# Patient Record
Sex: FEMALE | Race: WHITE
[De-identification: names, ages, dates, MRNs, and addresses within clinical notes are randomized per-mention and may not be internally consistent; named-entity substitution may affect disease eponyms.]

---

## 2018-02-21 ENCOUNTER — HOSPITAL ENCOUNTER (EMERGENCY)
Dept: HOSPITAL 62 - ER | Age: 53
Discharge: HOME | End: 2018-02-21
Payer: SELF-PAY

## 2018-02-21 VITALS — DIASTOLIC BLOOD PRESSURE: 69 MMHG | SYSTOLIC BLOOD PRESSURE: 129 MMHG

## 2018-02-21 DIAGNOSIS — R05: ICD-10-CM

## 2018-02-21 DIAGNOSIS — R09.81: ICD-10-CM

## 2018-02-21 DIAGNOSIS — R50.9: ICD-10-CM

## 2018-02-21 DIAGNOSIS — B34.9: Primary | ICD-10-CM

## 2018-02-21 LAB
ADD MANUAL DIFF: NO
ALBUMIN SERPL-MCNC: 5.4 G/DL (ref 3.5–5)
ALP SERPL-CCNC: 72 U/L (ref 38–126)
ALT SERPL-CCNC: 52 U/L (ref 9–52)
ANION GAP SERPL CALC-SCNC: 19 MMOL/L (ref 5–19)
APPEARANCE UR: (no result)
APTT PPP: (no result) S
AST SERPL-CCNC: 64 U/L (ref 14–36)
BASOPHILS # BLD AUTO: 0 10^3/UL (ref 0–0.2)
BASOPHILS NFR BLD AUTO: 0.2 % (ref 0–2)
BILIRUB DIRECT SERPL-MCNC: 0.2 MG/DL (ref 0–0.4)
BILIRUB SERPL-MCNC: 0.4 MG/DL (ref 0.2–1.3)
BILIRUB UR QL STRIP: NEGATIVE
BUN SERPL-MCNC: 20 MG/DL (ref 7–20)
CALCIUM: 10 MG/DL (ref 8.4–10.2)
CHLORIDE SERPL-SCNC: 97 MMOL/L (ref 98–107)
CO2 SERPL-SCNC: 24 MMOL/L (ref 22–30)
EOSINOPHIL # BLD AUTO: 0 10^3/UL (ref 0–0.6)
EOSINOPHIL NFR BLD AUTO: 0 % (ref 0–6)
ERYTHROCYTE [DISTWIDTH] IN BLOOD BY AUTOMATED COUNT: 13.4 % (ref 11.5–14)
GLUCOSE SERPL-MCNC: 114 MG/DL (ref 75–110)
GLUCOSE UR STRIP-MCNC: NEGATIVE MG/DL
HCT VFR BLD CALC: 43.7 % (ref 36–47)
HGB BLD-MCNC: 15.1 G/DL (ref 12–15.5)
KETONES UR STRIP-MCNC: 20 MG/DL
LYMPHOCYTES # BLD AUTO: 1.2 10^3/UL (ref 0.5–4.7)
LYMPHOCYTES NFR BLD AUTO: 22.3 % (ref 13–45)
MCH RBC QN AUTO: 30.3 PG (ref 27–33.4)
MCHC RBC AUTO-ENTMCNC: 34.5 G/DL (ref 32–36)
MCV RBC AUTO: 88 FL (ref 80–97)
MONOCYTES # BLD AUTO: 0.6 10^3/UL (ref 0.1–1.4)
MONOCYTES NFR BLD AUTO: 10.4 % (ref 3–13)
NEUTROPHILS # BLD AUTO: 3.7 10^3/UL (ref 1.7–8.2)
NEUTS SEG NFR BLD AUTO: 67.1 % (ref 42–78)
NITRITE UR QL STRIP: NEGATIVE
PH UR STRIP: 5 [PH] (ref 5–9)
PLATELET # BLD: 190 10^3/UL (ref 150–450)
POTASSIUM SERPL-SCNC: 4.2 MMOL/L (ref 3.6–5)
PROT SERPL-MCNC: 9.1 G/DL (ref 6.3–8.2)
PROT UR STRIP-MCNC: 100 MG/DL
RBC # BLD AUTO: 4.98 10^6/UL (ref 3.72–5.28)
SODIUM SERPL-SCNC: 139.7 MMOL/L (ref 137–145)
SP GR UR STRIP: 1.03
TOTAL CELLS COUNTED % (AUTO): 100 %
UROBILINOGEN UR-MCNC: NEGATIVE MG/DL (ref ?–2)
WBC # BLD AUTO: 5.5 10^3/UL (ref 4–10.5)

## 2018-02-21 PROCEDURE — 36415 COLL VENOUS BLD VENIPUNCTURE: CPT

## 2018-02-21 PROCEDURE — 71046 X-RAY EXAM CHEST 2 VIEWS: CPT

## 2018-02-21 PROCEDURE — 81001 URINALYSIS AUTO W/SCOPE: CPT

## 2018-02-21 PROCEDURE — 99285 EMERGENCY DEPT VISIT HI MDM: CPT

## 2018-02-21 PROCEDURE — 85025 COMPLETE CBC W/AUTO DIFF WBC: CPT

## 2018-02-21 PROCEDURE — 96374 THER/PROPH/DIAG INJ IV PUSH: CPT

## 2018-02-21 PROCEDURE — 96361 HYDRATE IV INFUSION ADD-ON: CPT

## 2018-02-21 PROCEDURE — 80053 COMPREHEN METABOLIC PANEL: CPT

## 2018-02-21 NOTE — ER DOCUMENT REPORT
ED Medical Screen (RME)





- General


Chief Complaint: Weakness


Stated Complaint: WEAKNESS


Time Seen by Provider: 02/21/18 14:07


Notes: 





pt only states "water" and "sick"


TRAVEL OUTSIDE OF THE [place] IN LAST 30 DAYS: No





- Related Data


Allergies/Adverse Reactions: 


 





No Known Allergies Allergy (Unverified 02/21/18 12:37)


 











Past Medical History


Renal/ Medical History: Denies: Hx Peritoneal Dialysis


Past Surgical History: Reports: Hx Hysterectomy





- Immunizations


Hx Diphtheria, Pertussis, Tetanus Vaccination: Yes





Physical Exam





- Vital signs


Vitals: 





 











Temp Pulse Resp BP Pulse Ox


 


 98.8 F   91   20   127/84 H  99 


 


 02/21/18 12:40  02/21/18 12:40  02/21/18 12:40  02/21/18 12:40  02/21/18 12:40














Course





- Vital Signs


Vital signs: 





 











Temp Pulse Resp BP Pulse Ox


 


 98.8 F   91   20   127/84 H  99 


 


 02/21/18 12:40  02/21/18 12:40  02/21/18 12:40  02/21/18 12:40  02/21/18 12:40

## 2018-02-21 NOTE — RADIOLOGY REPORT (SQ)
EXAM DESCRIPTION:  CHEST PA/LAT



COMPLETED DATE/TIME:  2/21/2018 4:01 pm



REASON FOR STUDY:  cough, fevers



COMPARISON:  None.



EXAM PARAMETERS:  NUMBER OF VIEWS: two views

TECHNIQUE: Digital Frontal and Lateral radiographic views of the chest acquired.

RADIATION DOSE: NA

LIMITATIONS: none



FINDINGS:  LUNGS AND PLEURA: No opacities, masses or pneumothorax. No pleural effusion.

MEDIASTINUM AND HILAR STRUCTURES: No masses or contour abnormalities.

HEART AND VASCULAR STRUCTURES: Heart size upper limits of normal.

BONES: No acute findings.

HARDWARE: None in the chest.

OTHER: No other significant finding.



IMPRESSION:  NO ACUTE RADIOGRAPHIC FINDING IN THE CHEST.



TECHNICAL DOCUMENTATION:  JOB ID:  [id number]

 2011 [id number]- All Rights Reserved

## 2018-02-21 NOTE — ER DOCUMENT REPORT
ED General





- General


Chief Complaint: Weakness


Stated Complaint: WEAKNESS


Time Seen by Provider: 02/21/18 14:07


Notes: 


The patient is a 52-year-old female, no past medical history, presents with 4 

days of generalized body aches, nasal congestion, subjective fevers, dry cough 

and chills.  She did not get her flu shot this year.  She last took Tylenol 

about 8 hours ago.  Patient denies nausea, vomiting, rash, headache, neck 

stiffness, recent travel, shortness of breath or chest pain.


TRAVEL OUTSIDE OF THE [place] IN LAST 30 DAYS: No





- Related Data


Allergies/Adverse Reactions: 


 





No Known Allergies Allergy (Unverified 02/21/18 12:37)


 











Past Medical History





- General


Information source: Patient





- Social History


Smoking Status: Unknown if Ever Smoked


Family History: Reviewed & Not Pertinent


Patient has suicidal ideation: No


Patient has homicidal ideation: No


Renal/ Medical History: Denies: Hx Peritoneal Dialysis


Past Surgical History: Reports: Hx Hysterectomy





- Immunizations


Hx Diphtheria, Pertussis, Tetanus Vaccination: Yes





Review of Systems





- Review of Systems


Notes: 


REVIEW OF SYSTEMS:


CONSTITUTIONAL: +fevers, +chills


EENT: -eye pain, -difficulty swallowing, +nasal congestion


CARDIOVASCULAR: -chest pain, -syncope.


RESPIRATORY: +cough, -SOB


GASTROINTESTINAL: -abdominal pain, -nausea, -vomiting, -diarrhea


GENITOURINARY: -dysuria, -hematuria


MUSCULOSKELETAL: -back pain, -neck pain


SKIN: -rash or skin lesions.


HEMATOLOGIC: -easy bruising or bleeding.


LYMPHATIC: -swollen, enlarged glands.


NEUROLOGICAL: -altered mental status or loss of consciousness, -headache, -

neurologic symptoms


PSYCHIATRIC: -anxiety, -depression.


ALL OTHER SYSTEMS REVIEWED AND NEGATIVE.





Physical Exam





- Vital signs


Vitals: 


 











Temp Pulse Resp BP Pulse Ox


 


 98.8 F   91   20   127/84 H  99 


 


 02/21/18 12:40  02/21/18 12:40  02/21/18 12:40  02/21/18 12:40  02/21/18 12:40














- Notes


Notes: 


PHYSICAL EXAMINATION:


GENERAL: Well-appearing, well-nourished and in no acute distress.


HEAD: Atraumatic, normocephalic.


EYES: Pupils equal round and reactive to light, extraocular movements intact, 

sclera anicteric, conjunctiva are normal.


ENT: nares patent, oropharynx clear without exudates.  Moist mucous membranes.


NECK: Normal range of motion, supple without lymphadenopathy


LUNGS: Breath sounds clear to auscultation bilaterally and equal.  No wheezes 

rales or rhonchi.


HEART: Regular rate and rhythm without murmurs


ABDOMEN: Soft, nontender, normoactive bowel sounds.  No guarding, no rebound.  

No masses appreciated.


EXTREMITIES: Normal range of motion, no pitting or edema.  No cyanosis.


NEUROLOGICAL: Cranial nerves grossly intact.  Normal speech, normal gait.  

Normal sensory and motor exams.


PSYCH: Normal mood, normal affect.


SKIN: Warm, Dry, normal turgor, no rashes or lesions noted.





Course





- Re-evaluation


Re-evalutation: 


Patient has symptoms of a viral illness, but her vital signs are normal.  Chest 

x-ray does not show any signs of pneumonia and the rest of blood work is 

unremarkable.  Instructed her about symptomatic treatment.  Since this is 5 

days of symptoms, Tamiflu is not indicated.  Given strict return precautions 

and she understands.





- Vital Signs


Vital signs: 


 











Temp Pulse Resp BP Pulse Ox


 


 98.8 F   91   20   127/84 H  99 


 


 02/21/18 12:40  02/21/18 12:40  02/21/18 12:40  02/21/18 12:40  02/21/18 12:40














- Laboratory


Result Diagrams: 


 02/21/18 14:35





 02/21/18 14:35


Laboratory results interpreted by me: 


 











  02/21/18 02/21/18





  14:35 15:16


 


Chloride  97 L 


 


Glucose  114 H 


 


AST  64 H 


 


Total Protein  9.1 H 


 


Albumin  5.4 H 


 


Urine Protein   100 H


 


Urine Ketones   20 H


 


Urine Ascorbic Acid   20 H














- Diagnostic Test


Radiology reviewed: Image reviewed, Reports reviewed


Radiology results interpreted by me: 


CXR: NAD





Discharge





- Discharge


Clinical Impression: 


 Viral illness, Nasal congestion, Cough





Condition: Stable


Disposition: HOME, SELF-CARE


Additional Instructions: 


INFLUENZA:





     The physician feels that you have influenza -- the "flu".  Influenza is an 

infection caused by a virus.  Symptoms include generalized aching, fever, 

headache, dry cough, and fatigue.  Some patients with the flu also have nausea, 

vomiting, and diarrhea.  The fever and aches usually last two to four days, 

with the cough persisting another one to two weeks.


     Treatment of the flu, for the most part, is simply treatment of symptoms. 

Rest, drink plenty of fluids, and use acetaminophen for fever and aches.  Do 

not take aspirin.  There is an anti-viral medication, called Tamiflu, which may 

help in "type A" flu, but it's not helpful in every case of flu, and only works 

if started within the first 24 - 48 hours of the start of symptoms.  The 

physician will determine whether this medication can help you.


     To prevent spread of the virus, use good handwashing.  Shared toys should 

be cleaned with disinfectant. Clean the toilets, sinks, and counter surfaces in 

bathrooms. Launder clothing in hot water.





What are conditions that should receive medical attention?


   The development of difficulty breathing.


   Lip color changes to blue or purple.


   Persistent vomiting and unable to keep liquids down with 


     signs of dehydration such as: dizziness when standing, unable to urinate, 

or if   child/infant is crying no tears are noticed.


   Is less responsive than normal or becomes confused. 





How do I decrease the spread of flu in my home?


Taking care of the sick patient at home:


   Keep the sick person in a room separate from the common areas of the house. 

Keep the "sickroom" door closed. 


   If the person with the flu needs to leave the home, they should cover their 

nose/mouth when coughing or sneezing and wear a disposable (surgical) mask if 

available. These masks may be available at your local pharmacy, medical supply 

and hardware store.


   If the sick person is in common areas of the house, have them wear a 

surgical mask.


   If possible, have the sick person use a separate bathroom that should be 

cleaned daily with a household disinfectant.


If you are the caregiver:


   Avoid being face to face with the sick adult person as much as possible.


   Try to stay at least 6 feet away and wear a disposable surgical mask when 

possible.


   When holding small children who are sick, place their chin on your shoulder 

so that they will not cough in your face.


   Wash your hands after you touch the sick person or handle their tissues and 

laundry.


   Wear a mask if you leave home, as you may be infected from taking care of 

someone and not know it yet.


   Watch yourself and others in the home for flu symptoms and contact your 

doctor if symptoms occur.  NOTE: Antiviral medication used to reduce the 

symptoms of the flu works only if taken within 48 hours, and best within 24 

hours of symptom onset.


Household Cleaning, laundry and waste disposal:


   Tissues and other disposable items used by the sick person should be thrown 

away in the trash. Wash your  hands after touching these used items. No special 

waste disposal is required.


   Keep surfaces (especially bedside tables, bathroom surfaces, and toys for 

children) clean by wiping them down with a safe household disinfectant 

according to the directions on the product label.





Per Center for Disease Control advice, most people will not receive testing to 

confirm flu. Also based on the person's health history and onset of symptoms, 

not all patients will receive prescriptions for antiviral medications. If you 

have questions related to this, please ask your healthcare provider.





 For more information, you can call the Centers for Disease Control and 

Prevention (CDC) Hotline at 7-703-EQEVGTI [place]


 This line is available in English and Sinhala, 24 hours a day, 7 days a week. 

Or www.[employer] or www.cdc.gov








Flu-Like Illness Home Instructions:





The influenza virus infection can cause a wide rage of symptoms, including:


   Fever, cough, sore throat, body aches, headaches, chills, fatigue, with 

some patients reporting diarrhea and vomiting


   Like seasonal influenza A, H1N1 ("swine flu")in humans can vary in severity 

from mild to severe


   Severe illness with pneumonia, respiratory failure and even death is 

possible


   Certain groups might be more likely to develop a severe illness from H1N1 

infection.


   Sometimes bacterial infections may occur at the same time as or after 

infection with influenza viruses and lead to pneumonias, ear infections, or 

sinus infections.





How Flu Spreads


   The main way that influenza viruses spread is through respiratory droplets 

of coughs and sneezes. This can happen when someone with the infection coughs 

or sneezes and the particles fly through the air and land on other people and 

surfaces. If the person covers their mouth and nose with their hand but does 

not wash their hands immediately, then these germs are passed onto the next 

object that they touch.





People with Influenza A or suspected H1N1 (swine flu) who are cared for at home 

should:


   Check with their doctor about any special care that they might need if they 

are pregnant or have a health condition such as diabetes, heart disease, asthma 

or emphysema.


   Also, limit caregiver to one (if possible). Pregnant women or those with 

chronic health conditions should not take care of the flu patient unless 

necessary.


   Check with their doctor about whether or not medications are needed that 

may lessen the symptoms of the flu.


   Stay at home until 24 hours fever free without the use of fever reducing 

medication.


   Get plenty of rest and avoid other healthy people in your home.


   Drink plenty of clear liquids to keep from getting dehydrated.


   Take medications like Tylenol (Acetaminophen), Advil/Motrin/Nuprin (

Ibuprofen) or Aleve (Naproxen) for fevers and aches. All children under the age 

of 18 years of age should not take aspirin or products containing aspirin (e.g. 

Pepto Bismol), as this can cause a rare serious illness called Reye Syndrome.


   Over the counter medications for flu and colds may help, but it is very 

important to follow the package directions. Remember that the medicine may help 

the symptoms, but it will not help prevent others from getting sick if they are 

around you.


   Cover coughs and sneezes using your bent arm. Clean hands with soap and 

water or an alcohol-based hand rub often, especially after using tissues to 

cough or sneeze.


   Encourage hand washing frequently for all people living in the home!


   The sick person should not have visitors other than caregivers. Encourage 

concerned loved ones to call instead of visit.


   Avoid close contact with others-do not go to work or school while sick.








USE OF ACETAMINOPHEN (Tylenol):


     Acetaminophen may be taken for pain relief or fever control. It's much 

safer than aspirin, offering a wider range of "safe" dosages.  It is safe 

during pregnancy.  Some brand names are Tylenol, Panadol, Datril, Anacin 3, 

Tempra, and Liquiprin. Acetaminophen can be repeated every four hours.  The 

following are maximum recommended dosages:





WEIGHT         Dose             Drops                  Elixir        Chewable(

80mg)


(LBS.)                            drprs=droppers    tsp=teaspoon


6               40 mg            0.4 ml (1/2)


6-11            80 mg            0.8 ml (full)              tsp               

   1       tab


12-16         120 mg           1 1/2 drprs             3/4  tsp               1 

1/2  tabs


17-23         160 mg             2  drprs             1    tsp                 

  2       tabs


24-30         240 mg             3  drprs             1 1/2 tsp                

3       tabs


30-35         320 mg                                       2    tsp            

       4       tabs


36-41         360 mg                                       2 1/4   tsp         

     4 1/2 tabs


42-47         400 mg                                       2 1/2   tsp         

     5      tabs


48-53         480 mg                                       3    tsp            

       6      tabs


54-59         520 mg                                       3  1/4  tsp         

     6 1/2 tabs


60-64         560 mg                                       3  1/2  tsp         

     7      tabs 


65-70         600 mg                                       3  3/4  tsp         

     7 1/2 tabs


71-76         640 mg                                       4   tsp             

      8      tabs


77-82         720 mg                                       4 1/2   tsp         

    9      tabs


83-88         800 mg                                       5   tsp             

    10      tabs





>89 pounds or adults          650 mg to 900 mg





Acetaminophen can be repeated every four hours.  Maximum dose not to exceed 

4000 mg a day.





   These maximum recommended dosages are slightly higher than the dosages 

written on the product container, but these dosages are very safe and below the 

toxic dosage for acetaminophen.





FOLLOW-UP CARE:


If you have been referred to a physician for follow-up care, call the physician

s office for an appointment as you were instructed or within the next two days.

  If you experience worsening or a significant change in your symptoms, notify 

the physician immediately or return to the Emergency Department at any time for 

re-evaluation.


Forms:  Elevated Blood Pressure


Referrals: 


LIBRA CEDEÑO DO [NO LOCAL MD] - Follow up as needed